# Patient Record
Sex: MALE | Race: ASIAN | URBAN - METROPOLITAN AREA
[De-identification: names, ages, dates, MRNs, and addresses within clinical notes are randomized per-mention and may not be internally consistent; named-entity substitution may affect disease eponyms.]

---

## 2020-12-07 ENCOUNTER — OUTPATIENT (OUTPATIENT)
Dept: OUTPATIENT SERVICES | Facility: HOSPITAL | Age: 55
LOS: 1 days | End: 2020-12-07
Payer: COMMERCIAL

## 2020-12-07 ENCOUNTER — APPOINTMENT (OUTPATIENT)
Dept: ULTRASOUND IMAGING | Facility: CLINIC | Age: 55
End: 2020-12-07
Payer: COMMERCIAL

## 2020-12-07 DIAGNOSIS — R10.9 UNSPECIFIED ABDOMINAL PAIN: ICD-10-CM

## 2020-12-07 DIAGNOSIS — Z00.8 ENCOUNTER FOR OTHER GENERAL EXAMINATION: ICD-10-CM

## 2020-12-07 PROCEDURE — 76775 US EXAM ABDO BACK WALL LIM: CPT | Mod: 26

## 2020-12-07 PROCEDURE — 76775 US EXAM ABDO BACK WALL LIM: CPT

## 2023-10-05 ENCOUNTER — EMERGENCY (EMERGENCY)
Facility: HOSPITAL | Age: 58
LOS: 0 days | Discharge: HOME | End: 2023-10-05
Attending: EMERGENCY MEDICINE
Payer: COMMERCIAL

## 2023-10-05 VITALS
DIASTOLIC BLOOD PRESSURE: 104 MMHG | SYSTOLIC BLOOD PRESSURE: 177 MMHG | RESPIRATION RATE: 18 BRPM | TEMPERATURE: 98 F | OXYGEN SATURATION: 100 % | HEART RATE: 90 BPM

## 2023-10-05 VITALS
OXYGEN SATURATION: 100 % | HEART RATE: 100 BPM | DIASTOLIC BLOOD PRESSURE: 168 MMHG | TEMPERATURE: 98 F | SYSTOLIC BLOOD PRESSURE: 180 MMHG | HEIGHT: 67 IN | WEIGHT: 167.99 LBS | RESPIRATION RATE: 18 BRPM

## 2023-10-05 DIAGNOSIS — R42 DIZZINESS AND GIDDINESS: ICD-10-CM

## 2023-10-05 DIAGNOSIS — E87.1 HYPO-OSMOLALITY AND HYPONATREMIA: ICD-10-CM

## 2023-10-05 DIAGNOSIS — I10 ESSENTIAL (PRIMARY) HYPERTENSION: ICD-10-CM

## 2023-10-05 DIAGNOSIS — R11.0 NAUSEA: ICD-10-CM

## 2023-10-05 DIAGNOSIS — R06.02 SHORTNESS OF BREATH: ICD-10-CM

## 2023-10-05 DIAGNOSIS — R00.2 PALPITATIONS: ICD-10-CM

## 2023-10-05 DIAGNOSIS — E78.5 HYPERLIPIDEMIA, UNSPECIFIED: ICD-10-CM

## 2023-10-05 DIAGNOSIS — M25.551 PAIN IN RIGHT HIP: ICD-10-CM

## 2023-10-05 DIAGNOSIS — M79.10 MYALGIA, UNSPECIFIED SITE: ICD-10-CM

## 2023-10-05 DIAGNOSIS — Z20.822 CONTACT WITH AND (SUSPECTED) EXPOSURE TO COVID-19: ICD-10-CM

## 2023-10-05 DIAGNOSIS — M54.9 DORSALGIA, UNSPECIFIED: ICD-10-CM

## 2023-10-05 DIAGNOSIS — R00.0 TACHYCARDIA, UNSPECIFIED: ICD-10-CM

## 2023-10-05 LAB
ALBUMIN SERPL ELPH-MCNC: 4.2 G/DL — SIGNIFICANT CHANGE UP (ref 3.3–5)
ALP SERPL-CCNC: 82 U/L — SIGNIFICANT CHANGE UP (ref 40–120)
ALT FLD-CCNC: 43 U/L — SIGNIFICANT CHANGE UP (ref 12–78)
ANION GAP SERPL CALC-SCNC: 7 MMOL/L — SIGNIFICANT CHANGE UP (ref 5–17)
APPEARANCE UR: CLEAR — SIGNIFICANT CHANGE UP
AST SERPL-CCNC: 18 U/L — SIGNIFICANT CHANGE UP (ref 15–37)
BASOPHILS # BLD AUTO: 0.1 K/UL — SIGNIFICANT CHANGE UP (ref 0–0.2)
BASOPHILS NFR BLD AUTO: 1 % — SIGNIFICANT CHANGE UP (ref 0–2)
BILIRUB SERPL-MCNC: 0.3 MG/DL — SIGNIFICANT CHANGE UP (ref 0.2–1.2)
BILIRUB UR-MCNC: NEGATIVE — SIGNIFICANT CHANGE UP
BUN SERPL-MCNC: 16 MG/DL — SIGNIFICANT CHANGE UP (ref 7–23)
CALCIUM SERPL-MCNC: 9.9 MG/DL — SIGNIFICANT CHANGE UP (ref 8.5–10.1)
CHLORIDE SERPL-SCNC: 95 MMOL/L — LOW (ref 96–108)
CO2 SERPL-SCNC: 25 MMOL/L — SIGNIFICANT CHANGE UP (ref 22–31)
COLOR SPEC: YELLOW — SIGNIFICANT CHANGE UP
CREAT SERPL-MCNC: 1.18 MG/DL — SIGNIFICANT CHANGE UP (ref 0.5–1.3)
D DIMER BLD IA.RAPID-MCNC: <150 NG/ML DDU — SIGNIFICANT CHANGE UP
DIFF PNL FLD: NEGATIVE — SIGNIFICANT CHANGE UP
EGFR: 72 ML/MIN/1.73M2 — SIGNIFICANT CHANGE UP
EOSINOPHIL # BLD AUTO: 0.87 K/UL — HIGH (ref 0–0.5)
EOSINOPHIL NFR BLD AUTO: 8.5 % — HIGH (ref 0–6)
GLUCOSE SERPL-MCNC: 235 MG/DL — HIGH (ref 70–99)
GLUCOSE UR QL: 500 MG/DL
HCT VFR BLD CALC: 39.8 % — SIGNIFICANT CHANGE UP (ref 39–50)
HGB BLD-MCNC: 14.6 G/DL — SIGNIFICANT CHANGE UP (ref 13–17)
IMM GRANULOCYTES NFR BLD AUTO: 0.4 % — SIGNIFICANT CHANGE UP (ref 0–0.9)
KETONES UR-MCNC: ABNORMAL MG/DL
LEUKOCYTE ESTERASE UR-ACNC: NEGATIVE — SIGNIFICANT CHANGE UP
LYMPHOCYTES # BLD AUTO: 3.35 K/UL — HIGH (ref 1–3.3)
LYMPHOCYTES # BLD AUTO: 32.9 % — SIGNIFICANT CHANGE UP (ref 13–44)
MAGNESIUM SERPL-MCNC: 1.7 MG/DL — SIGNIFICANT CHANGE UP (ref 1.6–2.6)
MCHC RBC-ENTMCNC: 30.2 PG — SIGNIFICANT CHANGE UP (ref 27–34)
MCHC RBC-ENTMCNC: 36.7 GM/DL — HIGH (ref 32–36)
MCV RBC AUTO: 82.4 FL — SIGNIFICANT CHANGE UP (ref 80–100)
MONOCYTES # BLD AUTO: 1.08 K/UL — HIGH (ref 0–0.9)
MONOCYTES NFR BLD AUTO: 10.6 % — SIGNIFICANT CHANGE UP (ref 2–14)
NEUTROPHILS # BLD AUTO: 4.75 K/UL — SIGNIFICANT CHANGE UP (ref 1.8–7.4)
NEUTROPHILS NFR BLD AUTO: 46.6 % — SIGNIFICANT CHANGE UP (ref 43–77)
NITRITE UR-MCNC: NEGATIVE — SIGNIFICANT CHANGE UP
PH UR: 6.5 — SIGNIFICANT CHANGE UP (ref 5–8)
PHOSPHATE SERPL-MCNC: 3.3 MG/DL — SIGNIFICANT CHANGE UP (ref 2.5–4.5)
PLATELET # BLD AUTO: 287 K/UL — SIGNIFICANT CHANGE UP (ref 150–400)
POTASSIUM SERPL-MCNC: 3.9 MMOL/L — SIGNIFICANT CHANGE UP (ref 3.5–5.3)
POTASSIUM SERPL-SCNC: 3.9 MMOL/L — SIGNIFICANT CHANGE UP (ref 3.5–5.3)
PROT SERPL-MCNC: 7.8 GM/DL — SIGNIFICANT CHANGE UP (ref 6–8.3)
PROT UR-MCNC: NEGATIVE MG/DL — SIGNIFICANT CHANGE UP
RAPID RVP RESULT: SIGNIFICANT CHANGE UP
RBC # BLD: 4.83 M/UL — SIGNIFICANT CHANGE UP (ref 4.2–5.8)
RBC # FLD: 11.6 % — SIGNIFICANT CHANGE UP (ref 10.3–14.5)
SARS-COV-2 RNA SPEC QL NAA+PROBE: SIGNIFICANT CHANGE UP
SODIUM SERPL-SCNC: 127 MMOL/L — LOW (ref 135–145)
SP GR SPEC: 1.02 — SIGNIFICANT CHANGE UP (ref 1–1.03)
TROPONIN I, HIGH SENSITIVITY RESULT: 4.73 NG/L — SIGNIFICANT CHANGE UP
TSH SERPL-MCNC: 1.12 UU/ML — SIGNIFICANT CHANGE UP (ref 0.34–4.82)
UROBILINOGEN FLD QL: 0.2 MG/DL — SIGNIFICANT CHANGE UP (ref 0.2–1)
WBC # BLD: 10.19 K/UL — SIGNIFICANT CHANGE UP (ref 3.8–10.5)
WBC # FLD AUTO: 10.19 K/UL — SIGNIFICANT CHANGE UP (ref 3.8–10.5)

## 2023-10-05 PROCEDURE — 81003 URINALYSIS AUTO W/O SCOPE: CPT

## 2023-10-05 PROCEDURE — 83735 ASSAY OF MAGNESIUM: CPT

## 2023-10-05 PROCEDURE — 84484 ASSAY OF TROPONIN QUANT: CPT

## 2023-10-05 PROCEDURE — 0225U NFCT DS DNA&RNA 21 SARSCOV2: CPT

## 2023-10-05 PROCEDURE — 87086 URINE CULTURE/COLONY COUNT: CPT

## 2023-10-05 PROCEDURE — 84443 ASSAY THYROID STIM HORMONE: CPT

## 2023-10-05 PROCEDURE — 93005 ELECTROCARDIOGRAM TRACING: CPT

## 2023-10-05 PROCEDURE — 93010 ELECTROCARDIOGRAM REPORT: CPT

## 2023-10-05 PROCEDURE — 84100 ASSAY OF PHOSPHORUS: CPT

## 2023-10-05 PROCEDURE — 71046 X-RAY EXAM CHEST 2 VIEWS: CPT | Mod: 26

## 2023-10-05 PROCEDURE — 85025 COMPLETE CBC W/AUTO DIFF WBC: CPT

## 2023-10-05 PROCEDURE — 99285 EMERGENCY DEPT VISIT HI MDM: CPT

## 2023-10-05 PROCEDURE — 99285 EMERGENCY DEPT VISIT HI MDM: CPT | Mod: 25

## 2023-10-05 PROCEDURE — 36415 COLL VENOUS BLD VENIPUNCTURE: CPT

## 2023-10-05 PROCEDURE — 71046 X-RAY EXAM CHEST 2 VIEWS: CPT

## 2023-10-05 PROCEDURE — 80053 COMPREHEN METABOLIC PANEL: CPT

## 2023-10-05 PROCEDURE — 85379 FIBRIN DEGRADATION QUANT: CPT

## 2023-10-05 RX ORDER — SODIUM CHLORIDE 9 MG/ML
1000 INJECTION INTRAMUSCULAR; INTRAVENOUS; SUBCUTANEOUS ONCE
Refills: 0 | Status: COMPLETED | OUTPATIENT
Start: 2023-10-05 | End: 2023-10-05

## 2023-10-05 RX ADMIN — SODIUM CHLORIDE 1000 MILLILITER(S): 9 INJECTION INTRAMUSCULAR; INTRAVENOUS; SUBCUTANEOUS at 18:39

## 2023-10-05 NOTE — ED STATDOCS - OBJECTIVE STATEMENT
57 y/o male with PMHx of HTN, HLD presents to ED c/o side effects s/p medication change. Was seen by PMD 10 days and was recently changed HTN/HLD medications from Losartan to Losartan HCTZ and Atorvastatin to Rosuvastatin. Reported change of medications was due to recent elevated blood pressure readings and he would develop body aches with Atorvastatin. Since a couple of days ago, pt states he would have dizziness, lightheadedness, photophobia/phonophobia, palpitations, constipation, nausea with no vomiting, and pain to right hip/back radiating down leg associated with pulsating vein. Noted shortness of breath with no chest pain. States right leg pain is worse when laying down at night. Pt was instructed by PMD to come to ED for further evaluation.

## 2023-10-05 NOTE — ED STATDOCS - NSFOLLOWUPINSTRUCTIONS_ED_ALL_ED_FT
Follow up with your primary care doctor to reevaluate the medicine that you are taking for your blood pressure and to recheck your blood work/sodium.  Continue to stay hydrated at home.   Return to the Emergency Department for worsening or persistent symptoms, and/or ANY NEW OR CONCERNING SYMPTOMS. If you have issues obtaining follow up, please call: [phone] or [phone]  to obtain a doctor or specialist who takes your insurance in your area.           Hyponatremia  Hyponatremia is when the amount of salt (sodium) in a person's blood is too low. When sodium levels are low, the cells absorb extra water, which causes them to swell. The swelling happens throughout the body, but it mostly affects the brain.    What are the causes?  This condition may be caused by:  Certain medical conditions, such as:  Heart, kidney, or liver problems.  Thyroid problems.  Adrenal gland problems.  Metabolic conditions, such as Berkshire's disease or syndrome of inappropriate antidiuresis (SIAD).  Excessive vomiting, diarrhea, or sweating.  Certain medicines or illegal drugs.  Fluids given through an IV.  What increases the risk?  You are more likely to develop this condition if you:  Have certain medical conditions such as heart, kidney, or liver failure.  Have a medical condition that causes frequent or excessive diarrhea.  Participate in intense physical activities, such as marathon running.  Take certain medicines that affect the sodium and fluid balance in the blood. Some of these medicine types include:  Diuretics.  NSAIDs, such as ibuprofen.  Some opioid pain medicines.  Some antidepressants.  Some seizure prevention medicines.  What are the signs or symptoms?  Symptoms of this condition include:  Headache.  Nausea and vomiting.  Being very tired (lethargic).  Muscle weakness and cramping.  Loss of appetite.  Feeling weak or light-headed.  Severe symptoms of this condition include:  Confusion.  Agitation.  Having a rapid heart rate.  Fainting.  Seizures.  Coma.  How is this diagnosed?  This condition is diagnosed based on:  A physical exam.  Your medical history.  Tests, including:  Blood tests.  Urine tests.  How is this treated?  A person receiving fluids through an IV in the arm. The person is in a hospital bed.  Treatment for this condition depends on the cause. Treatment may include:  Getting fluids through an IV that is inserted into one of your veins.  Medicines to correct the sodium imbalance. If medicines are causing the condition, the medicines will need to be adjusted.  Limiting your water or fluid intake to get the correct sodium balance, in certain cases.  Monitoring in the hospital to closely watch your symptoms for improvement.  Follow these instructions at home:  A sign showing that a person should not drink alcohol.  Take over-the-counter and prescription medicines only as told by your health care provider. Many medicines can make this condition worse. Talk with your health care provider about any medicines that you are currently taking.  Do not drink alcohol.  Keep all follow-up visits. This is important.  Contact a health care provider if:  You develop worsening nausea, fatigue, headache, confusion, or weakness.  Your symptoms go away and then return.  Get help right away if:  You have a seizure.  You faint.  You have ongoing diarrhea or vomiting.  Summary  Hyponatremia is when the amount of salt (sodium) in your blood is too low.  When sodium levels are low, your cells absorb extra water, which causes them to swell.  The swelling happens throughout the body, but it mostly affects the brain.  Treatment for this condition depends on the cause. It may include receiving IV fluids, taking or adjusting medicines, limiting fluid intake, and monitoring in the hospital.

## 2023-10-05 NOTE — ED ADULT NURSE NOTE - CHIEF COMPLAINT QUOTE
Pt presents to the ED c/o dizziness since 10am, right groin and low back pain x4 days, and palpitations. BEFAST negative. Pt saw Dr. Cabral at [facility] and was told to come to the ED for further evaluation. PMH of DM2, HLD, and HTN. Pt sent for EKG.

## 2023-10-05 NOTE — ED STATDOCS - CARE PLAN
Principal Discharge DX:	Hyponatremia  Secondary Diagnosis:	Lightheaded  Secondary Diagnosis:	SOB (shortness of breath)   1

## 2023-10-05 NOTE — ED STATDOCS - PATIENT PORTAL LINK FT
You can access the FollowMyHealth Patient Portal offered by [facility] by registering at the following website: [email or web address]. By joining [facility]’s FollowMyHealth portal, you will also be able to view your health information using other applications (apps) compatible with our system.

## 2023-10-05 NOTE — ED STATDOCS - CLINICAL SUMMARY MEDICAL DECISION MAKING FREE TEXT BOX
EKG nonischemic.  CXR clear.  Mild hyponatremia on labs.  No other findings on workup.  Given IVF with improvement in symptom.  Possible diuretic side effect.  Recommend f/u closely with prescribing doctor, repeat labs, return precautions given.

## 2023-10-05 NOTE — ED STATDOCS - PROGRESS NOTE DETAILS
59yo male presents with multiple medical complaints. Pt states he was having sciatica like pain from the R hip to the knee x 4-5 days. Pt was taking motrin and tylenol with relief. Pt states for the last 1-2 days he notices son palpitations, lightheaded ad body aches. Pt was recently changed from his losartan t losartan with hctz. Will check labs, scxr, ekg, ua and reeval. -Javon Mendoza PA-C Informed pt of his lab. DD and trop unremarkable but pt also has a . Possibly from the change to losartan/hctz. Will give IVF, and advised pt to f/u with is pmd to have the labs rechecked and eval treatment for his BP. Pt aware and agrees with plan. -Javon Mendoza PA-C

## 2023-10-05 NOTE — ED ADULT TRIAGE NOTE - CHIEF COMPLAINT QUOTE
Pt presents to the ED c/o dizziness since 10am, right groin and low back pain x4 days, and palpitations. BEFAST negative. Pt saw Dr. Cabral at [facility] and was told to come to the ED for further evaluation. PMH of DM2, HLD, and HTN. Pt sent for EKG.

## 2023-10-05 NOTE — ED STATDOCS - MUSCULOSKELETAL, MLM
range of motion is not limited and there is no muscle tenderness. no reproducible back or leg tenderness, no swelling

## 2023-10-07 LAB
CULTURE RESULTS: SIGNIFICANT CHANGE UP
SPECIMEN SOURCE: SIGNIFICANT CHANGE UP

## 2024-03-25 ENCOUNTER — OFFICE (OUTPATIENT)
Dept: URBAN - METROPOLITAN AREA CLINIC 6 | Facility: CLINIC | Age: 59
Setting detail: OPHTHALMOLOGY
End: 2024-03-25
Payer: COMMERCIAL

## 2024-03-25 DIAGNOSIS — H25.13: ICD-10-CM

## 2024-03-25 DIAGNOSIS — E11.9: ICD-10-CM

## 2024-03-25 DIAGNOSIS — H52.4: ICD-10-CM

## 2024-03-25 DIAGNOSIS — E11.3393: ICD-10-CM

## 2024-03-25 PROBLEM — H52.7 REFRACTIVE ERROR: Status: ACTIVE | Noted: 2024-03-25

## 2024-03-25 PROCEDURE — 92015 DETERMINE REFRACTIVE STATE: CPT

## 2024-03-25 PROCEDURE — 92250 FUNDUS PHOTOGRAPHY W/I&R: CPT

## 2024-03-25 PROCEDURE — 92004 COMPRE OPH EXAM NEW PT 1/>: CPT

## 2024-03-25 ASSESSMENT — REFRACTION_MANIFEST
OD_VA1: 20/20
OS_ADD: +2.25
OS_VA1: 20/20
OD_CYLINDER: SPH
OS_SPHERE: +1.25
OS_CYLINDER: SPH
OD_ADD: +2.25
OD_SPHERE: +1.00

## 2024-03-25 ASSESSMENT — REFRACTION_CURRENTRX
OS_SPHERE: +2.75
OD_SPHERE: +2.75
OS_CYLINDER: SPHERE
OD_OVR_VA: 20/
OD_VPRISM_DIRECTION: SV
OD_CYLINDER: SPHERE
OS_OVR_VA: 20/
OS_VPRISM_DIRECTION: SV